# Patient Record
Sex: FEMALE | Race: WHITE | NOT HISPANIC OR LATINO | URBAN - NONMETROPOLITAN AREA
[De-identification: names, ages, dates, MRNs, and addresses within clinical notes are randomized per-mention and may not be internally consistent; named-entity substitution may affect disease eponyms.]

---

## 2017-03-08 ENCOUNTER — OFFICE (OUTPATIENT)
Dept: URBAN - NONMETROPOLITAN AREA CLINIC 13 | Facility: CLINIC | Age: 57
End: 2017-03-08
Payer: COMMERCIAL

## 2017-03-08 ENCOUNTER — OFFICE (OUTPATIENT)
Dept: URBAN - NONMETROPOLITAN AREA CLINIC 13 | Facility: CLINIC | Age: 57
End: 2017-03-08

## 2017-03-08 VITALS — HEIGHT: 66 IN

## 2017-03-08 VITALS
HEART RATE: 81 BPM | SYSTOLIC BLOOD PRESSURE: 101 MMHG | DIASTOLIC BLOOD PRESSURE: 63 MMHG | RESPIRATION RATE: 18 BRPM | HEIGHT: 66 IN | WEIGHT: 150 LBS

## 2017-03-08 DIAGNOSIS — R10.13 EPIGASTRIC PAIN: ICD-10-CM

## 2017-03-08 DIAGNOSIS — K76.0 FATTY (CHANGE OF) LIVER, NOT ELSEWHERE CLASSIFIED: ICD-10-CM

## 2017-03-08 DIAGNOSIS — K21.9 GASTRO-ESOPHAGEAL REFLUX DISEASE WITHOUT ESOPHAGITIS: ICD-10-CM

## 2017-03-08 DIAGNOSIS — K59.00 CONSTIPATION, UNSPECIFIED: ICD-10-CM

## 2017-03-08 DIAGNOSIS — R12 HEARTBURN: ICD-10-CM

## 2017-03-08 DIAGNOSIS — R11.2 NAUSEA WITH VOMITING, UNSPECIFIED: ICD-10-CM

## 2017-03-08 LAB
AMYLASE: 82 U/L (ref 25–125)
CBC EMERALD: HEMATOCRIT: 47.4 % — HIGH (ref 37–47)
CBC EMERALD: HEMOGLOBIN: 15.7 G/DL — HIGH (ref 12–14)
CBC EMERALD: LYMPHS %: 41.8 % — HIGH (ref 20.5–40.5)
CBC EMERALD: LYMPHS ABSOLUTE: 3.9 10*3U/L — HIGH (ref 1.3–2.9)
CBC EMERALD: MCH: 31.7 PG (ref 27–32)
CBC EMERALD: MCHC: 33.1 G/DL (ref 32–35)
CBC EMERALD: MCV: 95.7 FL (ref 84–100)
CBC EMERALD: MONOS %: 8.9 % (ref 2–10)
CBC EMERALD: MONOS ABSOLUTE: 0.8 10*3U/L (ref 0.3–3.8)
CBC EMERALD: MPV: 8.8 FL (ref 7.4–10.4)
CBC EMERALD: NEUT. %: 49.3 % (ref 43–65)
CBC EMERALD: NEUT. ABSOLUTE: 4.6 10*3U/L (ref 2.2–4.8)
CBC EMERALD: PLATELETS: 254 10*3U/L (ref 130–440)
CBC EMERALD: RBC: 5 10*6U/L (ref 4.2–5.4)
CBC EMERALD: RDW: 13.4 % (ref 11.5–15.5)
CBC EMERALD: WBC: 9.3 10*3U/L (ref 4.5–10.5)
COMPLETE METABOLIC: ALBUMIN: 4.9 G/DL (ref 3.5–5.2)
COMPLETE METABOLIC: ALK. PHOS: 138 U/L (ref 40–150)
COMPLETE METABOLIC: ALT: 27 U/L (ref 0–55)
COMPLETE METABOLIC: AST: 26 U/L (ref 5–34)
COMPLETE METABOLIC: BUN: 14 MG/DL (ref 7–26)
COMPLETE METABOLIC: CALCIUM: 10.6 MG/DL — HIGH (ref 8.4–10.2)
COMPLETE METABOLIC: CHLORIDE: 102 MMOL/L (ref 98–107)
COMPLETE METABOLIC: CO2: 28 MEQ/L (ref 22–29)
COMPLETE METABOLIC: CREATININE: 0.8 MG/DL (ref 0.6–1.3)
COMPLETE METABOLIC: GLUCOSE: 98 MG/DL (ref 70–99)
COMPLETE METABOLIC: POTASSIUM: 5.1 MMOL/L (ref 3.5–5.3)
COMPLETE METABOLIC: SODIUM: 141 MMOL/L (ref 136–145)
COMPLETE METABOLIC: TOTAL BILIRUBIN: 0.4 MG/DL (ref 0.2–1.2)
COMPLETE METABOLIC: TOTAL PROTEIN: 7.4 G/DL (ref 6.4–8.3)
LIPASE: 22 U/L (ref 8–78)

## 2017-03-08 PROCEDURE — 83690 ASSAY OF LIPASE: CPT

## 2017-03-08 PROCEDURE — 82150 ASSAY OF AMYLASE: CPT

## 2017-03-08 PROCEDURE — 85025 COMPLETE CBC W/AUTO DIFF WBC: CPT

## 2017-03-08 PROCEDURE — 80053 COMPREHEN METABOLIC PANEL: CPT

## 2017-03-08 PROCEDURE — 99213 OFFICE O/P EST LOW 20 MIN: CPT

## 2017-03-08 RX ORDER — PROMETHAZINE HYDROCHLORIDE 12.5 MG/1
TABLET ORAL
Qty: 20 | Refills: 1 | Status: ACTIVE
Start: 2016-08-01

## 2017-04-14 ENCOUNTER — OFFICE (OUTPATIENT)
Dept: URBAN - NONMETROPOLITAN AREA CLINIC 13 | Facility: CLINIC | Age: 57
End: 2017-04-14
Payer: COMMERCIAL

## 2017-04-14 ENCOUNTER — OFFICE (OUTPATIENT)
Dept: URBAN - NONMETROPOLITAN AREA CLINIC 13 | Facility: CLINIC | Age: 57
End: 2017-04-14
Payer: MEDICARE

## 2017-04-14 VITALS
WEIGHT: 149 LBS | SYSTOLIC BLOOD PRESSURE: 110 MMHG | HEIGHT: 66 IN | HEART RATE: 64 BPM | DIASTOLIC BLOOD PRESSURE: 75 MMHG

## 2017-04-14 VITALS — HEIGHT: 66 IN

## 2017-04-14 DIAGNOSIS — E78.1 PURE HYPERGLYCERIDEMIA: ICD-10-CM

## 2017-04-14 DIAGNOSIS — R10.13 EPIGASTRIC PAIN: ICD-10-CM

## 2017-04-14 DIAGNOSIS — R68.81 EARLY SATIETY: ICD-10-CM

## 2017-04-14 DIAGNOSIS — K59.00 CONSTIPATION, UNSPECIFIED: ICD-10-CM

## 2017-04-14 DIAGNOSIS — K21.9 GASTRO-ESOPHAGEAL REFLUX DISEASE WITHOUT ESOPHAGITIS: ICD-10-CM

## 2017-04-14 DIAGNOSIS — E83.52 HYPERCALCEMIA: ICD-10-CM

## 2017-04-14 DIAGNOSIS — R11.2 NAUSEA WITH VOMITING, UNSPECIFIED: ICD-10-CM

## 2017-04-14 DIAGNOSIS — R11.0 NAUSEA: ICD-10-CM

## 2017-04-14 DIAGNOSIS — R12 HEARTBURN: ICD-10-CM

## 2017-04-14 LAB
AMYLASE: 82 U/L (ref 25–125)
CBC EMERALD: HEMATOCRIT: 40.7 % (ref 37–47)
CBC EMERALD: HEMOGLOBIN: 13.7 G/DL (ref 12–14)
CBC EMERALD: LYMPHS %: 36.8 % (ref 20.5–40.5)
CBC EMERALD: LYMPHS ABSOLUTE: 2.6 10*3U/L (ref 1.3–2.9)
CBC EMERALD: MCH: 30.7 PG (ref 27–32)
CBC EMERALD: MCHC: 33.7 G/DL (ref 32–35)
CBC EMERALD: MCV: 91.3 FL (ref 84–100)
CBC EMERALD: MONOS %: 12.8 % — HIGH (ref 2–10)
CBC EMERALD: MONOS ABSOLUTE: 0.9 10*3U/L (ref 0.3–3.8)
CBC EMERALD: MPV: 10.4 FL (ref 7.4–10.4)
CBC EMERALD: NEUT. %: 50.4 % (ref 43–65)
CBC EMERALD: NEUT. ABSOLUTE: 3.5 10*3U/L (ref 2.2–4.8)
CBC EMERALD: PLATELETS: 208 10*3U/L (ref 130–440)
CBC EMERALD: RBC: 4.5 10*6U/L (ref 4.2–5.4)
CBC EMERALD: RDW: 11.8 % (ref 11.5–15.5)
CBC EMERALD: WBC: 7 10*3U/L (ref 4.5–10.5)
COMPLETE METABOLIC: ALBUMIN: 4.6 G/DL (ref 3.5–5.2)
COMPLETE METABOLIC: ALK. PHOS: 112 U/L (ref 40–150)
COMPLETE METABOLIC: ALT: 14 U/L (ref 0–55)
COMPLETE METABOLIC: AST: 14 U/L (ref 5–34)
COMPLETE METABOLIC: BUN: 8 MG/DL (ref 7–26)
COMPLETE METABOLIC: CALCIUM: 10.1 MG/DL (ref 8.4–10.2)
COMPLETE METABOLIC: CHLORIDE: 108 MMOL/L — HIGH (ref 98–107)
COMPLETE METABOLIC: CO2: 27 MEQ/L (ref 22–29)
COMPLETE METABOLIC: CREATININE: 0.8 MG/DL (ref 0.6–1.3)
COMPLETE METABOLIC: GLUCOSE: 106 MG/DL — HIGH (ref 70–99)
COMPLETE METABOLIC: POTASSIUM: 4.3 MMOL/L (ref 3.5–5.3)
COMPLETE METABOLIC: SODIUM: 144 MMOL/L (ref 136–145)
COMPLETE METABOLIC: TOTAL BILIRUBIN: 0.7 MG/DL (ref 0.2–1.2)
COMPLETE METABOLIC: TOTAL PROTEIN: 6.9 G/DL (ref 6.4–8.3)
LIPASE: 36 U/L (ref 8–78)

## 2017-04-14 PROCEDURE — 36415 COLL VENOUS BLD VENIPUNCTURE: CPT

## 2017-04-14 PROCEDURE — 80053 COMPREHEN METABOLIC PANEL: CPT

## 2017-04-14 PROCEDURE — 83690 ASSAY OF LIPASE: CPT

## 2017-04-14 PROCEDURE — 82150 ASSAY OF AMYLASE: CPT

## 2017-04-14 PROCEDURE — 85025 COMPLETE CBC W/AUTO DIFF WBC: CPT

## 2017-04-14 PROCEDURE — 74160 CT ABDOMEN W/CONTRAST: CPT | Mod: TC

## 2017-04-14 PROCEDURE — 99203 OFFICE O/P NEW LOW 30 MIN: CPT | Mod: 25

## 2017-04-14 RX ORDER — PROMETHAZINE HYDROCHLORIDE 12.5 MG/1
TABLET ORAL
Qty: 20 | Refills: 1 | Status: ACTIVE
Start: 2016-08-01

## 2017-04-25 ENCOUNTER — OFFICE (OUTPATIENT)
Dept: URBAN - NONMETROPOLITAN AREA CLINIC 13 | Facility: CLINIC | Age: 57
End: 2017-04-25
Payer: MEDICARE

## 2017-04-25 VITALS
SYSTOLIC BLOOD PRESSURE: 120 MMHG | HEIGHT: 66 IN | WEIGHT: 151 LBS | DIASTOLIC BLOOD PRESSURE: 73 MMHG | HEART RATE: 72 BPM

## 2017-04-25 DIAGNOSIS — R12 HEARTBURN: ICD-10-CM

## 2017-04-25 DIAGNOSIS — K21.9 GASTRO-ESOPHAGEAL REFLUX DISEASE WITHOUT ESOPHAGITIS: ICD-10-CM

## 2017-04-25 DIAGNOSIS — R10.13 EPIGASTRIC PAIN: ICD-10-CM

## 2017-04-25 DIAGNOSIS — K59.00 CONSTIPATION, UNSPECIFIED: ICD-10-CM

## 2017-04-25 DIAGNOSIS — R11.0 NAUSEA: ICD-10-CM

## 2017-04-25 PROCEDURE — 99213 OFFICE O/P EST LOW 20 MIN: CPT

## 2025-03-22 ENCOUNTER — HOSPITAL ENCOUNTER (EMERGENCY)
Age: 65
Discharge: HOME | End: 2025-03-23
Payer: MEDICARE

## 2025-03-22 ENCOUNTER — APPOINTMENT (OUTPATIENT)
Dept: CT IMAGING | Age: 65
End: 2025-03-22
Payer: MEDICARE

## 2025-03-22 VITALS
WEIGHT: 146 LBS | BODY MASS INDEX: 24.32 KG/M2 | RESPIRATION RATE: 18 BRPM | TEMPERATURE: 98.1 F | HEART RATE: 65 BPM | DIASTOLIC BLOOD PRESSURE: 65 MMHG | OXYGEN SATURATION: 98 % | SYSTOLIC BLOOD PRESSURE: 122 MMHG | HEIGHT: 65 IN

## 2025-03-22 DIAGNOSIS — K59.00 CONSTIPATION, UNSPECIFIED CONSTIPATION TYPE: Primary | ICD-10-CM

## 2025-03-22 LAB
ALBUMIN SERPL-MCNC: 4.3 G/DL (ref 3.5–5.2)
ALP SERPL-CCNC: 112 U/L (ref 35–104)
ALT SERPL-CCNC: 35 U/L (ref 10–35)
ANION GAP SERPL CALCULATED.3IONS-SCNC: 11 MMOL/L (ref 8–16)
AST SERPL-CCNC: 47 U/L (ref 10–35)
BACTERIA URNS QL MICRO: NEGATIVE /HPF
BASOPHILS # BLD: 0 K/UL (ref 0–0.2)
BASOPHILS NFR BLD: 0.3 % (ref 0–1)
BILIRUB SERPL-MCNC: 0.5 MG/DL (ref 0.2–1.2)
BILIRUB UR QL STRIP: NEGATIVE
BUN SERPL-MCNC: 5 MG/DL (ref 8–23)
CALCIUM SERPL-MCNC: 9.9 MG/DL (ref 8.8–10.2)
CHLORIDE SERPL-SCNC: 107 MMOL/L (ref 98–107)
CLARITY UR: CLEAR
CO2 SERPL-SCNC: 27 MMOL/L (ref 22–29)
COLOR UR: YELLOW
CREAT SERPL-MCNC: 0.8 MG/DL (ref 0.5–0.9)
CRYSTALS URNS MICRO: ABNORMAL /HPF
EOSINOPHIL # BLD: 0 K/UL (ref 0–0.6)
EOSINOPHIL NFR BLD: 0.2 % (ref 0–5)
EPI CELLS #/AREA URNS AUTO: 1 /HPF (ref 0–5)
ERYTHROCYTE [DISTWIDTH] IN BLOOD BY AUTOMATED COUNT: 12.2 % (ref 11.5–14.5)
GLUCOSE SERPL-MCNC: 97 MG/DL (ref 70–99)
GLUCOSE UR STRIP.AUTO-MCNC: NEGATIVE MG/DL
HCT VFR BLD AUTO: 39.2 % (ref 37–47)
HGB BLD-MCNC: 12.8 G/DL (ref 12–16)
HGB UR STRIP.AUTO-MCNC: NEGATIVE MG/L
HYALINE CASTS #/AREA URNS AUTO: 0 /HPF (ref 0–8)
IMM GRANULOCYTES # BLD: 0 K/UL
KETONES UR STRIP.AUTO-MCNC: NEGATIVE MG/DL
LEUKOCYTE ESTERASE UR QL STRIP.AUTO: ABNORMAL
LYMPHOCYTES # BLD: 3.4 K/UL (ref 1.1–4.5)
LYMPHOCYTES NFR BLD: 39.9 % (ref 20–40)
MCH RBC QN AUTO: 30.3 PG (ref 27–31)
MCHC RBC AUTO-ENTMCNC: 32.7 G/DL (ref 33–37)
MCV RBC AUTO: 92.9 FL (ref 81–99)
MONOCYTES # BLD: 1.1 K/UL (ref 0–0.9)
MONOCYTES NFR BLD: 12.7 % (ref 0–10)
NEUTROPHILS # BLD: 4 K/UL (ref 1.5–7.5)
NEUTS SEG NFR BLD: 46.7 % (ref 50–65)
NITRITE UR QL STRIP.AUTO: NEGATIVE
PH UR STRIP.AUTO: 7.5 [PH] (ref 5–8)
PLATELET # BLD AUTO: 144 K/UL (ref 130–400)
PMV BLD AUTO: 10.6 FL (ref 9.4–12.3)
POTASSIUM SERPL-SCNC: 3.6 MMOL/L (ref 3.5–5)
PROT SERPL-MCNC: 6.8 G/DL (ref 6.4–8.3)
PROT UR STRIP.AUTO-MCNC: NEGATIVE MG/DL
RBC # BLD AUTO: 4.22 M/UL (ref 4.2–5.4)
RBC #/AREA URNS AUTO: 1 /HPF (ref 0–4)
SODIUM SERPL-SCNC: 145 MMOL/L (ref 136–145)
SP GR UR STRIP.AUTO: >=1.045 (ref 1–1.03)
UROBILINOGEN UR STRIP.AUTO-MCNC: 0.2 E.U./DL
WBC # BLD AUTO: 8.6 K/UL (ref 4.8–10.8)
WBC #/AREA URNS AUTO: 13 /HPF (ref 0–5)

## 2025-03-22 PROCEDURE — 36415 COLL VENOUS BLD VENIPUNCTURE: CPT

## 2025-03-22 PROCEDURE — 80053 COMPREHEN METABOLIC PANEL: CPT

## 2025-03-22 PROCEDURE — 74177 CT ABD & PELVIS W/CONTRAST: CPT

## 2025-03-22 PROCEDURE — 85025 COMPLETE CBC W/AUTO DIFF WBC: CPT

## 2025-03-22 PROCEDURE — 99285 EMERGENCY DEPT VISIT HI MDM: CPT

## 2025-03-22 PROCEDURE — 6360000004 HC RX CONTRAST MEDICATION: Performed by: PHYSICIAN ASSISTANT

## 2025-03-22 RX ORDER — HYDROCODONE BITARTRATE AND ACETAMINOPHEN 7.5; 325 MG/1; MG/1
1 TABLET ORAL EVERY 6 HOURS PRN
COMMUNITY

## 2025-03-22 RX ORDER — PANTOPRAZOLE SODIUM 40 MG/1
40 TABLET, DELAYED RELEASE ORAL DAILY
COMMUNITY

## 2025-03-22 RX ORDER — ROSUVASTATIN CALCIUM 10 MG/1
10 TABLET, COATED ORAL DAILY
COMMUNITY

## 2025-03-22 RX ORDER — FOLIC ACID 1 MG/1
1 TABLET ORAL DAILY
COMMUNITY

## 2025-03-22 RX ORDER — GLUCOSAMINE/D3/BOSWELLIA SERRA 1500MG-400
TABLET ORAL
COMMUNITY

## 2025-03-22 RX ORDER — GINSENG 100 MG
50 CAPSULE ORAL DAILY
COMMUNITY

## 2025-03-22 RX ORDER — IOPAMIDOL 755 MG/ML
90 INJECTION, SOLUTION INTRAVASCULAR
Status: COMPLETED | OUTPATIENT
Start: 2025-03-22 | End: 2025-03-22

## 2025-03-22 RX ORDER — POLYETHYLENE GLYCOL 3350 17 G/17G
17 POWDER, FOR SOLUTION ORAL DAILY
Qty: 510 G | Refills: 0 | Status: SHIPPED | OUTPATIENT
Start: 2025-03-22 | End: 2025-04-21

## 2025-03-22 RX ORDER — HYDROCORTISONE ACETATE SUPPOSITORY 30 MG/1
1 SUPPOSITORY RECTAL 3 TIMES DAILY
Qty: 28 SUPPOSITORY | Refills: 0 | Status: SHIPPED | OUTPATIENT
Start: 2025-03-22

## 2025-03-22 RX ORDER — ERGOCALCIFEROL 1.25 MG/1
50000 CAPSULE, LIQUID FILLED ORAL WEEKLY
COMMUNITY

## 2025-03-22 RX ORDER — HYDROCORTISONE ACETATE 25 MG/1
25 SUPPOSITORY RECTAL ONCE
Status: COMPLETED | OUTPATIENT
Start: 2025-03-22 | End: 2025-03-23

## 2025-03-22 RX ORDER — LEVOTHYROXINE SODIUM 50 UG/1
50 TABLET ORAL DAILY
COMMUNITY

## 2025-03-22 RX ADMIN — IOPAMIDOL 90 ML: 755 INJECTION, SOLUTION INTRAVENOUS at 22:59

## 2025-03-23 PROCEDURE — 87086 URINE CULTURE/COLONY COUNT: CPT

## 2025-03-23 PROCEDURE — 6370000000 HC RX 637 (ALT 250 FOR IP): Performed by: PHYSICIAN ASSISTANT

## 2025-03-23 PROCEDURE — 81001 URINALYSIS AUTO W/SCOPE: CPT

## 2025-03-23 RX ADMIN — HYDROCORTISONE ACETATE 25 MG: 25 SUPPOSITORY RECTAL at 00:10

## 2025-03-23 NOTE — ED PROVIDER NOTES
[facility] EMERGENCY DEPARTMENT  eMERGENCYdEPARTMENT eNCOUnter      Pt Name: Glendy Harris  MRN: [id number]  Birthdate 1960  Date of evaluation: 3/22/2025  Provider:LALITHA Morgan    CHIEF COMPLAINT       Chief Complaint   Patient presents with    Constipation     X2 weeks         HISTORY OF PRESENT ILLNESS  (Location/Symptom, Timing/Onset, Context/Setting, Quality, Duration, Modifying Factors, Severity.)   Glendy Harris is a 64 y.o. female who presents to the emergency department with complaints of constipation 2 weeks making gas prone to constipation normally 4-5 day BM she is making gas denies fever chills. She is afraid to eat much as she isnt pooping states doing broth. She takes pain meds baseline. Prior hemorrhoid hx. 2 years ago had hemoridectomy and stretchign states didn't work. Would prefer to see local surgeon.     HPI    Nursing Notes were reviewed and I agree.    REVIEW OF SYSTEMS    (2-9 systems for level 4, 10 or more for level 5)     Review of Systems   Constitutional:  Negative for activity change, appetite change, chills and fever.   HENT:  Negative for congestion, postnasal drip, rhinorrhea and sore throat.    Eyes:  Negative for photophobia, pain, discharge and visual disturbance.   Respiratory:  Negative for apnea, cough and shortness of breath.    Cardiovascular:  Negative for chest pain and leg swelling.   Gastrointestinal:  Negative for abdominal distention, abdominal pain and nausea.   Genitourinary:  Negative for vaginal bleeding.   Musculoskeletal:  Negative for arthralgias, back pain, joint swelling, neck pain and neck stiffness.   Skin:  Negative for color change and rash.   Neurological:  Negative for dizziness, syncope, facial asymmetry and headaches.   Hematological:  Negative for adenopathy. Does not bruise/bleed easily.   Psychiatric/Behavioral:  Negative for agitation, behavioral problems and confusion.         Except as noted above the remainder of the review of systems

## 2025-03-24 ENCOUNTER — TELEPHONE (OUTPATIENT)
Dept: SURGERY | Age: 65
End: 2025-03-24

## 2025-03-24 NOTE — TELEPHONE ENCOUNTER
Patient was in the ED and was told to get appointment with Terrell Mondragon MD (Colon and Rectal Surgery) . Please called patient [phone]     Thank you

## 2025-03-25 LAB — BACTERIA UR CULT: NORMAL
